# Patient Record
(demographics unavailable — no encounter records)

---

## 2025-04-21 NOTE — ASSESSMENT
[FreeTextEntry1] : 3 left shoulder dislocations over the last several months each with less force.  Range of motion and strength is overall full although he has some mild pain.  X-rays from outside hospital and today both reviewed showing a possible small Hill-Sachs deformity of the humeral head - MRI of the left shoulder due to Hill-Sachs deformity to evaluate for underlying labral and tendinopathic injury - Physical therapy referral - Follow-up after MRI

## 2025-04-21 NOTE — HISTORY OF PRESENT ILLNESS
[de-identified] : 04/21/2025: Patient is a 17-year-old male LHD presenting for evaluation of left shoulder injury.  On 1/20/2024 his left shoulder dislocated.  He went to [facility] in [place] where he had x-rays taken showing a Hill-Sachs deformity. This had happened 1 time prior and has happened another time since then. Someone outside of the hospital reduced the shoulder each time. Each time has become easier to be dislocated. He has mild pain with use of the arm.

## 2025-04-21 NOTE — HISTORY OF PRESENT ILLNESS
[de-identified] : 04/21/2025: Patient is a 17-year-old male LHD presenting for evaluation of left shoulder injury.  On 1/20/2024 his left shoulder dislocated.  He went to [facility] in [place] where he had x-rays taken showing a Hill-Sachs deformity. This had happened 1 time prior and has happened another time since then. Someone outside of the hospital reduced the shoulder each time. Each time has become easier to be dislocated. He has mild pain with use of the arm.

## 2025-04-21 NOTE — REVIEW OF SYSTEMS
[Arthralgia] : arthralgia [Joint Pain] : joint pain [Joint Stiffness] : joint stiffness [Joint Swelling] : joint swelling [Negative] : Integumentary [Chills] : no chills

## 2025-04-21 NOTE — PHYSICAL EXAM
[de-identified] : Constitutional: Well developed, well nourished, able to communicate Neuro: Normal sensation, No focal deficits Skin: Intact CV: Peripheral vascular exam grossly normal Pulm: No signs of respiratory distress Psych: Oriented, normal mood and affect  L shoulder: - No obvious deformity or swelling - No pain with palpation over AC joint, anterior or posterior shoulder - Forward flexion to 180 degrees, External rotation to 30 degrees, Internal rotation to T12. MIld pain with max ROM - 5/5 strength with internal and external rotation - Mild pain with empty can - Positive impingement testing - pain with Speeds - Distally neurovascularly intact    R shoulder: - No obvious deformity or swelling - No pain with palpation over AC joint, anterior or posterior shoulder - Forward flexion to 180 degrees, External rotation to 30 degrees, Internal rotation to T12 - 5/5 strength with internal and external rotation - Distally neurovascularly intact [de-identified] : 3 views of the L shoulder without fracture, dislocation. Possible small hill sachs.

## 2025-04-21 NOTE — PHYSICAL EXAM
[de-identified] : Constitutional: Well developed, well nourished, able to communicate Neuro: Normal sensation, No focal deficits Skin: Intact CV: Peripheral vascular exam grossly normal Pulm: No signs of respiratory distress Psych: Oriented, normal mood and affect  L shoulder: - No obvious deformity or swelling - No pain with palpation over AC joint, anterior or posterior shoulder - Forward flexion to 180 degrees, External rotation to 30 degrees, Internal rotation to T12. MIld pain with max ROM - 5/5 strength with internal and external rotation - Mild pain with empty can - Positive impingement testing - pain with Speeds - Distally neurovascularly intact    R shoulder: - No obvious deformity or swelling - No pain with palpation over AC joint, anterior or posterior shoulder - Forward flexion to 180 degrees, External rotation to 30 degrees, Internal rotation to T12 - 5/5 strength with internal and external rotation - Distally neurovascularly intact [de-identified] : 3 views of the L shoulder without fracture, dislocation. Possible small hill sachs.

## 2025-05-12 NOTE — ASSESSMENT
[FreeTextEntry1] : 3 left shoulder dislocations over the last several months each with less force.  Range of motion and strength is overall full although he has some mild pain.  X-rays from outside hospital and today both reviewed showing a possible small Hill-Sachs deformity of the humeral head. MRI showing small hill sachs, labral injury, small effusion.  - Can start PT/OT - If unsuccessful and shoulder dislocates again, recommend evaluation by shoulder specialist to discuss surgery - Follow up in 2 months if needed

## 2025-05-12 NOTE — HISTORY OF PRESENT ILLNESS
[de-identified] : 05/12/2025: Presenting for follow up of L shoulder injury. Since last visit, he obtained his MRI. He is scheduled to start OT next week. Was not able to get into PT due to waitlist. Denies any new injuries, but states he still has intermittent shoulder pain.    04/21/2025: Patient is a 17-year-old male LHD presenting for evaluation of left shoulder injury.  On 1/20/2024 his left shoulder dislocated.  He went to [facility] where he had x-rays taken showing a Hill-Sachs deformity. This had happened 1 time prior and has happened another time since then. Someone outside of the hospital reduced the shoulder each time. Each time has become easier to be dislocated. He has mild pain with use of the arm.

## 2025-05-12 NOTE — REVIEW OF SYSTEMS
[Arthralgia] : arthralgia [Joint Pain] : joint pain [Joint Stiffness] : joint stiffness [Joint Swelling] : joint swelling [Chills] : no chills [Negative] : Integumentary

## 2025-05-12 NOTE — PHYSICAL EXAM
[de-identified] : Constitutional: Well developed, well nourished, able to communicate Neuro: Normal sensation, No focal deficits Skin: Intact CV: Peripheral vascular exam grossly normal Pulm: No signs of respiratory distress Psych: Oriented, normal mood and affect  L shoulder: - No obvious deformity or swelling - No pain with palpation over AC joint, anterior or posterior shoulder - Forward flexion to 180 degrees, External rotation to 30 degrees, Internal rotation to T12. MIld pain with max ROM - 5/5 strength with internal and external rotation - Mild pain with empty can - Positive impingement testing - pain with Speeds - Distally neurovascularly intact    R shoulder: - No obvious deformity or swelling - No pain with palpation over AC joint, anterior or posterior shoulder - Forward flexion to 180 degrees, External rotation to 30 degrees, Internal rotation to T12 - 5/5 strength with internal and external rotation - Distally neurovascularly intact [de-identified] : 3 views of the L shoulder without fracture, dislocation. Possible small hill sachs.